# Patient Record
Sex: FEMALE | Race: WHITE | Employment: OTHER | URBAN - METROPOLITAN AREA
[De-identification: names, ages, dates, MRNs, and addresses within clinical notes are randomized per-mention and may not be internally consistent; named-entity substitution may affect disease eponyms.]

---

## 2017-11-17 ENCOUNTER — TELEPHONE (OUTPATIENT)
Dept: UROLOGY | Facility: HOSPITAL | Age: 57
End: 2017-11-17

## 2017-11-17 RX ORDER — MELATONIN
1000 DAILY
COMMUNITY

## 2017-11-17 RX ORDER — GARLIC EXTRACT 500 MG
1 CAPSULE ORAL DAILY
COMMUNITY

## 2017-11-17 RX ORDER — VITS A,C,E/LUTEIN/MINERALS 300MCG-200
1 TABLET ORAL
COMMUNITY

## 2017-11-17 NOTE — TELEPHONE ENCOUNTER
Patient recurrent UTIs and yeast infections. Last UTI 10/9/17 100,000 colonies MI Enterobacter aerogenes A treated with Marcobid per lab susceptibility to be intermediate.   Using Osphena for vag. dryness  8/21/17- 10,000-50,000 Group B streptococus Took Am

## 2017-11-21 ENCOUNTER — OFFICE VISIT (OUTPATIENT)
Dept: UROLOGY | Facility: HOSPITAL | Age: 57
End: 2017-11-21
Attending: OBSTETRICS & GYNECOLOGY
Payer: COMMERCIAL

## 2017-11-21 VITALS
SYSTOLIC BLOOD PRESSURE: 96 MMHG | DIASTOLIC BLOOD PRESSURE: 62 MMHG | HEIGHT: 65 IN | BODY MASS INDEX: 19.49 KG/M2 | WEIGHT: 117 LBS

## 2017-11-21 DIAGNOSIS — N81.6 RECTOCELE: ICD-10-CM

## 2017-11-21 DIAGNOSIS — N81.84 PELVIC MUSCLE WASTING: ICD-10-CM

## 2017-11-21 DIAGNOSIS — N81.11 CYSTOCELE, MIDLINE: ICD-10-CM

## 2017-11-21 DIAGNOSIS — N95.2 POSTMENOPAUSAL ATROPHIC VAGINITIS: ICD-10-CM

## 2017-11-21 DIAGNOSIS — N39.41 URGE INCONTINENCE: Primary | ICD-10-CM

## 2017-11-21 DIAGNOSIS — N36.2 URETHRAL CARUNCLE: ICD-10-CM

## 2017-11-21 PROCEDURE — 87086 URINE CULTURE/COLONY COUNT: CPT | Performed by: OBSTETRICS & GYNECOLOGY

## 2017-11-21 PROCEDURE — 99201 HC OUTPT EVAL AND MGNT NEW PT LEVEL 1: CPT

## 2017-11-21 PROCEDURE — 87147 CULTURE TYPE IMMUNOLOGIC: CPT | Performed by: OBSTETRICS & GYNECOLOGY

## 2017-11-21 PROCEDURE — 81002 URINALYSIS NONAUTO W/O SCOPE: CPT

## 2017-11-21 RX ORDER — CHOLECALCIFEROL (VITAMIN D3) 125 MCG
CAPSULE ORAL
COMMUNITY

## 2017-11-21 RX ORDER — ESTRADIOL 0.1 MG/G
CREAM VAGINAL
Qty: 1 TUBE | Refills: 3 | Status: SHIPPED | OUTPATIENT
Start: 2017-11-21 | End: 2019-02-07

## 2017-11-21 NOTE — PROGRESS NOTES
Nguyen Elder,   11/21/2017     Referred by insurance ([employer])    Patient presents with:  Bladder Infection: pt here for recurrent UTI, \"pelvic floor weakness\", UUI and POP  Prolapse  Incontinence    8/17 10-50k GBS  10/9/17 >100k enteroba Oral Tab Take by mouth.  Disp:  Rfl:    Desonide 0.05 % External Cream Apply to AA on right ear BID x 1-2 weeks Disp: 15 g Rfl: 1   MULTIPLE VITAMINS OR None Entered Disp:  Rfl:    ASPIRIN 81 MG OR TABS 1 TABLET DAILY Disp:  Rfl:    ValACYclovir HCl ([facility]    (N81.11) Cystocele, midline  Plan: POCT URINALYSIS DIPSTICK, URINE CULTURE,         ROUTINE, OP REFERRAL TO [facility]    (N36.2) Urethral caruncle  Plan: POCT URINALYSIS DIPSTICK, URINE CULTURE,

## 2017-11-21 NOTE — PATIENT INSTRUCTIONS
[id number] [address] PELVIC MEDICINE    BOWEL REGIMEN    Constipation can have detrimental effects on bladder function and can worsen the symptoms of prolapse. It is important to avoid constipation.     The first step for treating constipation is to i

## 2017-11-22 ENCOUNTER — TELEPHONE (OUTPATIENT)
Dept: UROLOGY | Facility: HOSPITAL | Age: 57
End: 2017-11-22

## 2018-03-27 ENCOUNTER — OFFICE VISIT (OUTPATIENT)
Dept: UROLOGY | Facility: HOSPITAL | Age: 58
End: 2018-03-27
Attending: OBSTETRICS & GYNECOLOGY
Payer: COMMERCIAL

## 2018-03-27 VITALS
DIASTOLIC BLOOD PRESSURE: 64 MMHG | SYSTOLIC BLOOD PRESSURE: 102 MMHG | BODY MASS INDEX: 19.49 KG/M2 | HEIGHT: 65 IN | WEIGHT: 117 LBS

## 2018-03-27 DIAGNOSIS — N81.11 CYSTOCELE, MIDLINE: ICD-10-CM

## 2018-03-27 DIAGNOSIS — N39.41 URGE INCONTINENCE: Primary | ICD-10-CM

## 2018-03-27 DIAGNOSIS — N36.2 URETHRAL CARUNCLE: ICD-10-CM

## 2018-03-27 DIAGNOSIS — N81.84 PELVIC MUSCLE WASTING: ICD-10-CM

## 2018-03-27 DIAGNOSIS — N95.2 POSTMENOPAUSAL ATROPHIC VAGINITIS: ICD-10-CM

## 2018-03-27 DIAGNOSIS — N81.6 RECTOCELE: ICD-10-CM

## 2018-03-27 PROCEDURE — 99211 OFF/OP EST MAY X REQ PHY/QHP: CPT

## 2018-03-27 NOTE — PROGRESS NOTES
Patient presents to follow up bulge, urgency, weakness    She is currently using PT  No leaking (no KAROLYN)  Some urgency, at times - sx improved  Bowels regular (benefiber)    Vag estrogen cream twice weekly  Last pap wnl (2017)  mammo UTD    She reports +im

## 2018-07-10 ENCOUNTER — OFFICE VISIT (OUTPATIENT)
Dept: UROLOGY | Facility: HOSPITAL | Age: 58
End: 2018-07-10
Attending: OBSTETRICS & GYNECOLOGY
Payer: COMMERCIAL

## 2018-07-10 VITALS
SYSTOLIC BLOOD PRESSURE: 118 MMHG | DIASTOLIC BLOOD PRESSURE: 72 MMHG | HEIGHT: 65 IN | WEIGHT: 111 LBS | BODY MASS INDEX: 18.49 KG/M2

## 2018-07-10 DIAGNOSIS — N95.2 POSTMENOPAUSAL ATROPHIC VAGINITIS: ICD-10-CM

## 2018-07-10 DIAGNOSIS — N81.6 RECTOCELE: ICD-10-CM

## 2018-07-10 DIAGNOSIS — N39.41 URGE INCONTINENCE: Primary | ICD-10-CM

## 2018-07-10 DIAGNOSIS — N81.11 CYSTOCELE, MIDLINE: ICD-10-CM

## 2018-07-10 DIAGNOSIS — N36.2 URETHRAL CARUNCLE: ICD-10-CM

## 2018-07-10 DIAGNOSIS — N81.84 PELVIC MUSCLE WASTING: ICD-10-CM

## 2018-07-10 LAB
BILIRUB UR QL STRIP.AUTO: NEGATIVE
CLARITY UR REFRACT.AUTO: CLEAR
COLOR UR AUTO: YELLOW
CONTROL RUN WITHIN 24 HOURS?: YES
GLUCOSE UR STRIP.AUTO-MCNC: NEGATIVE MG/DL
KETONES UR STRIP.AUTO-MCNC: NEGATIVE MG/DL
LEUKOCYTE ESTERASE URINE: NEGATIVE
NITRITE UR QL STRIP.AUTO: NEGATIVE
NITRITE URINE: NEGATIVE
PH UR STRIP.AUTO: 7 [PH] (ref 4.5–8)
PROT UR STRIP.AUTO-MCNC: NEGATIVE MG/DL
RBC UR QL AUTO: NEGATIVE
SP GR UR STRIP.AUTO: 1 (ref 1–1.03)
UROBILINOGEN UR STRIP.AUTO-MCNC: <2 MG/DL

## 2018-07-10 PROCEDURE — 99211 OFF/OP EST MAY X REQ PHY/QHP: CPT

## 2018-07-10 PROCEDURE — 81001 URINALYSIS AUTO W/SCOPE: CPT | Performed by: OBSTETRICS & GYNECOLOGY

## 2018-07-10 PROCEDURE — 81002 URINALYSIS NONAUTO W/O SCOPE: CPT

## 2018-07-10 PROCEDURE — 87086 URINE CULTURE/COLONY COUNT: CPT | Performed by: OBSTETRICS & GYNECOLOGY

## 2018-07-10 RX ORDER — PYRIDOXINE HCL (VITAMIN B6) 100 MG
TABLET ORAL
COMMUNITY

## 2018-07-10 NOTE — PROGRESS NOTES
Patient presents to follow up bulge    She is currently using pelvic floor PT  Minimal bulge sx, less bothersome    Two UTIs since last visit, 4/2018  Sx in June empiric NTF - sx resolution  No clear trigger    ?vague UTI sx    Stopped osphena, using vag e

## 2019-02-07 ENCOUNTER — TELEPHONE (OUTPATIENT)
Dept: UROLOGY | Facility: HOSPITAL | Age: 59
End: 2019-02-07

## 2019-02-07 RX ORDER — ESTRADIOL 0.1 MG/G
CREAM VAGINAL
Qty: 1 TUBE | Refills: 3 | Status: SHIPPED | OUTPATIENT
Start: 2019-02-07

## 2019-02-07 RX ORDER — ESTRADIOL 0.1 MG/G
CREAM VAGINAL
Qty: 1 TUBE | Refills: 3 | Status: SHIPPED | OUTPATIENT
Start: 2019-02-07 | End: 2019-02-07

## 2019-02-07 NOTE — TELEPHONE ENCOUNTER
Pt called d/t the fact she has new insurance company and needs us to order the Estrace for [employer]. Informed her we will order it, will take a couple days for them to get a hold of her for information. Pt agreed to plan.

## 2019-02-07 NOTE — TELEPHONE ENCOUNTER
Pt called requesting estrace refill. Per last visit notes (7-10-18), pt to continue estrace use. Refill placed.